# Patient Record
Sex: FEMALE | Race: WHITE | NOT HISPANIC OR LATINO | Employment: OTHER | URBAN - METROPOLITAN AREA
[De-identification: names, ages, dates, MRNs, and addresses within clinical notes are randomized per-mention and may not be internally consistent; named-entity substitution may affect disease eponyms.]

---

## 2023-09-22 PROBLEM — H92.02 OTALGIA, LEFT EAR: Status: ACTIVE | Noted: 2023-09-22

## 2023-09-22 PROBLEM — G43.511 MIGRAINE AURA, PERSISTENT, INTRACTABLE, WITH STATUS MIGRAINOSUS: Status: ACTIVE | Noted: 2023-09-22

## 2023-09-22 PROBLEM — H90.5 SENSORINEURAL HEARING LOSS OF LEFT EAR: Status: ACTIVE | Noted: 2023-09-22

## 2023-09-22 PROBLEM — H93.13 TINNITUS, BILATERAL: Status: ACTIVE | Noted: 2023-09-22

## 2023-09-22 RX ORDER — TOPIRAMATE 25 MG/1
25 TABLET ORAL 2 TIMES DAILY
COMMUNITY
End: 2024-01-02

## 2023-09-22 RX ORDER — IBUPROFEN 800 MG/1
TABLET ORAL
COMMUNITY
Start: 2016-01-25

## 2023-09-22 RX ORDER — SUMATRIPTAN SUCCINATE 100 MG/1
100 TABLET ORAL
COMMUNITY
Start: 2016-01-25

## 2023-09-22 RX ORDER — CHLORZOXAZONE 750 MG/1
TABLET ORAL
COMMUNITY
Start: 2016-08-12

## 2023-09-22 RX ORDER — VIT C/E/ZN/COPPR/LUTEIN/ZEAXAN 250MG-90MG
CAPSULE ORAL
COMMUNITY
Start: 2016-03-08

## 2023-09-22 RX ORDER — NYSTATIN 100000 [USP'U]/G
POWDER TOPICAL
COMMUNITY
Start: 2016-06-29

## 2023-09-22 RX ORDER — LANOLIN ALCOHOL/MO/W.PET/CERES
CREAM (GRAM) TOPICAL
COMMUNITY

## 2023-09-22 RX ORDER — MIDODRINE HYDROCHLORIDE 5 MG/1
5 TABLET ORAL 2 TIMES DAILY
COMMUNITY

## 2023-09-22 RX ORDER — ERGOCALCIFEROL 1.25 MG/1
CAPSULE ORAL
COMMUNITY
Start: 2016-06-08

## 2023-09-22 RX ORDER — FLUDROCORTISONE ACETATE 0.1 MG/1
0.1 TABLET ORAL DAILY
COMMUNITY

## 2023-11-30 ENCOUNTER — APPOINTMENT (OUTPATIENT)
Dept: PAIN MEDICINE | Facility: CLINIC | Age: 43
End: 2023-11-30

## 2024-01-02 DIAGNOSIS — G43.511 MIGRAINE AURA, PERSISTENT, INTRACTABLE, WITH STATUS MIGRAINOSUS: Primary | ICD-10-CM

## 2024-01-02 RX ORDER — TOPIRAMATE 25 MG/1
25 TABLET ORAL 2 TIMES DAILY
Qty: 180 TABLET | Refills: 1 | Status: SHIPPED | OUTPATIENT
Start: 2024-01-02

## 2024-01-11 ENCOUNTER — APPOINTMENT (OUTPATIENT)
Dept: PAIN MEDICINE | Facility: CLINIC | Age: 44
End: 2024-01-11
Payer: COMMERCIAL

## 2024-02-08 ENCOUNTER — APPOINTMENT (OUTPATIENT)
Dept: PAIN MEDICINE | Facility: CLINIC | Age: 44
End: 2024-02-08
Payer: COMMERCIAL

## 2024-02-22 ENCOUNTER — OFFICE VISIT (OUTPATIENT)
Dept: PAIN MEDICINE | Facility: CLINIC | Age: 44
End: 2024-02-22
Payer: COMMERCIAL

## 2024-02-22 VITALS
DIASTOLIC BLOOD PRESSURE: 60 MMHG | HEIGHT: 67 IN | RESPIRATION RATE: 16 BRPM | SYSTOLIC BLOOD PRESSURE: 90 MMHG | WEIGHT: 146 LBS | HEART RATE: 86 BPM | BODY MASS INDEX: 22.91 KG/M2

## 2024-02-22 DIAGNOSIS — G43.511 MIGRAINE AURA, PERSISTENT, INTRACTABLE, WITH STATUS MIGRAINOSUS: Primary | ICD-10-CM

## 2024-02-22 PROCEDURE — 2500000004 HC RX 250 GENERAL PHARMACY W/ HCPCS (ALT 636 FOR OP/ED): Mod: JZ | Performed by: ANESTHESIOLOGY

## 2024-02-22 PROCEDURE — 1036F TOBACCO NON-USER: CPT | Performed by: ANESTHESIOLOGY

## 2024-02-22 PROCEDURE — 96372 THER/PROPH/DIAG INJ SC/IM: CPT | Performed by: ANESTHESIOLOGY

## 2024-02-22 PROCEDURE — 64615 CHEMODENERV MUSC MIGRAINE: CPT | Performed by: ANESTHESIOLOGY

## 2024-02-22 RX ADMIN — ONABOTULINUMTOXINA 200 UNITS: 100 INJECTION, POWDER, LYOPHILIZED, FOR SOLUTION INTRADERMAL; INTRAMUSCULAR at 11:29

## 2024-02-22 ASSESSMENT — ENCOUNTER SYMPTOMS
GASTROINTESTINAL NEGATIVE: 1
ALLERGIC/IMMUNOLOGIC NEGATIVE: 1
EYES NEGATIVE: 1
CARDIOVASCULAR NEGATIVE: 1
PSYCHIATRIC NEGATIVE: 1
ENDOCRINE NEGATIVE: 1
RESPIRATORY NEGATIVE: 1
HEMATOLOGIC/LYMPHATIC NEGATIVE: 1
HEADACHES: 1
MUSCULOSKELETAL NEGATIVE: 1
CONSTITUTIONAL NEGATIVE: 1

## 2024-02-22 ASSESSMENT — LIFESTYLE VARIABLES: TOTAL SCORE: 5

## 2024-02-22 ASSESSMENT — PAIN SCALES - GENERAL: PAINLEVEL_OUTOF10: 3

## 2024-02-22 ASSESSMENT — PATIENT HEALTH QUESTIONNAIRE - PHQ9
1. LITTLE INTEREST OR PLEASURE IN DOING THINGS: NOT AT ALL
SUM OF ALL RESPONSES TO PHQ9 QUESTIONS 1 AND 2: 0
2. FEELING DOWN, DEPRESSED OR HOPELESS: NOT AT ALL

## 2024-02-22 ASSESSMENT — PAIN - FUNCTIONAL ASSESSMENT: PAIN_FUNCTIONAL_ASSESSMENT: 0-10

## 2024-02-22 NOTE — PROGRESS NOTES
Subjective   Patient ID: Tasha Robertson is a 43 y.o. female who presents for Migraine.  Migraine     Patient here today for Botox injections for chronic migraine.  She continues to have excellent pain relief with only having 1-2 migraine days per month.  She wants to continue therapy moving forward.  Review of Systems   Constitutional: Negative.    HENT: Negative.     Eyes: Negative.    Respiratory: Negative.     Cardiovascular: Negative.    Gastrointestinal: Negative.    Endocrine: Negative.    Genitourinary: Negative.    Musculoskeletal: Negative.    Skin: Negative.    Allergic/Immunologic: Negative.    Neurological:  Positive for headaches.   Hematological: Negative.    Psychiatric/Behavioral: Negative.         Objective   Physical Exam  Vitals and nursing note reviewed.   Constitutional:       Appearance: Normal appearance.   HENT:      Head: Normocephalic and atraumatic.      Right Ear: Ear canal and external ear normal.      Left Ear: Ear canal and external ear normal.      Nose: Nose normal.      Mouth/Throat:      Mouth: Mucous membranes are moist.      Pharynx: Oropharynx is clear.   Eyes:      Conjunctiva/sclera: Conjunctivae normal.      Pupils: Pupils are equal, round, and reactive to light.   Cardiovascular:      Rate and Rhythm: Normal rate.   Pulmonary:      Effort: Pulmonary effort is normal. No respiratory distress.   Musculoskeletal:      Cervical back: Normal range of motion and neck supple.      Lumbar back: Tenderness present. Normal range of motion.   Skin:     General: Skin is warm and dry.   Neurological:      Mental Status: She is alert.   Psychiatric:         Mood and Affect: Mood normal.         Thought Content: Thought content normal.   Botulinum Injection - Head/Face/Jaw    Date/Time: 2/22/2024 11:22 AM    Performed by: Kamran Zhong MD  Authorized by: Kamran Zhong MD      Consent:      Consent obtained:  Written     Consent given by:  Patient     Risks discussed:   Weakness    Procedure details:      EMG used?  No     Electrical stimulation used?  No     Diluted by:  Preservative free saline     Toxin (Brand):  OnaBoNT-A (Botox)     Comments about vials and dilution:  4 ml of NS in 200 unit     Concentration (u/mL):  50     Total units available:  200       Total units injected:  0     Total units wasted:  200    Post-procedure details:      Patient tolerance of procedure:  Tolerated well, no immediate complications    Comments: Botox for Migraines      I have reviewed the history and physical and examined the patient and there are no changes unless noted below:       Timeout was performed to verify patient name, , allergies and procedure being performed at     Patient is aware of potential risks and benefits of this procedure. Patient wishes to proceed.      Botox injection for migraine headache.  The skin was prepped with Alcohol swabs prior to the injection.  2- 100 unit Botox A vials were each were reconstituted in 2 ml of normal saline.  Then using a 30-gauge needle, 155 units of Botox A was injected at 31 different locations in divided doses according to migraine protocol.  The targeted muscles include the , procerus, frontalis, temporalis, occipitalis, cervical paraspinals and trapezius muscles bilaterally.  Patient tolerated the procedure without any issues.  155 units of Botox used and 45 units wasted.            Assessment/Plan   Problem List Items Addressed This Visit             ICD-10-CM       Neuro    Migraine aura, persistent, intractable, with status migrainosus - Primary G43.511    Relevant Medications    onabotulinumtoxinA (Botox) injection 200 Units (Start on 2024 11:45 AM)        Patient doing excellent today continues to see excellent benefit with Botox injection.  We will repeat in 12 weeks.  Risks, benefit, and alternatives of the procedure were discussed with the patient.  Oswestry score has been compelted and recorded.    Kamran JOSÉ  MD [place] 02/22/24 11:21 AM

## 2024-03-14 ENCOUNTER — APPOINTMENT (OUTPATIENT)
Dept: PAIN MEDICINE | Facility: CLINIC | Age: 44
End: 2024-03-14
Payer: COMMERCIAL

## 2024-05-23 ENCOUNTER — OFFICE VISIT (OUTPATIENT)
Dept: PAIN MEDICINE | Facility: CLINIC | Age: 44
End: 2024-05-23
Payer: COMMERCIAL

## 2024-05-23 VITALS
SYSTOLIC BLOOD PRESSURE: 100 MMHG | DIASTOLIC BLOOD PRESSURE: 62 MMHG | HEIGHT: 67 IN | WEIGHT: 146 LBS | BODY MASS INDEX: 22.91 KG/M2 | RESPIRATION RATE: 16 BRPM

## 2024-05-23 DIAGNOSIS — G43.511 MIGRAINE AURA, PERSISTENT, INTRACTABLE, WITH STATUS MIGRAINOSUS: Primary | ICD-10-CM

## 2024-05-23 PROCEDURE — 96372 THER/PROPH/DIAG INJ SC/IM: CPT | Mod: 59 | Performed by: ANESTHESIOLOGY

## 2024-05-23 PROCEDURE — 2500000004 HC RX 250 GENERAL PHARMACY W/ HCPCS (ALT 636 FOR OP/ED): Mod: JZ | Performed by: ANESTHESIOLOGY

## 2024-05-23 PROCEDURE — 64615 CHEMODENERV MUSC MIGRAINE: CPT | Performed by: ANESTHESIOLOGY

## 2024-05-23 PROCEDURE — 1036F TOBACCO NON-USER: CPT | Performed by: ANESTHESIOLOGY

## 2024-05-23 RX ADMIN — ONABOTULINUMTOXINA 155 UNITS: 100 INJECTION, POWDER, LYOPHILIZED, FOR SOLUTION INTRADERMAL; INTRAMUSCULAR at 11:44

## 2024-05-23 ASSESSMENT — PATIENT HEALTH QUESTIONNAIRE - PHQ9
2. FEELING DOWN, DEPRESSED OR HOPELESS: NOT AT ALL
SUM OF ALL RESPONSES TO PHQ9 QUESTIONS 1 AND 2: 0
1. LITTLE INTEREST OR PLEASURE IN DOING THINGS: NOT AT ALL

## 2024-05-23 ASSESSMENT — ENCOUNTER SYMPTOMS
RESPIRATORY NEGATIVE: 1
CARDIOVASCULAR NEGATIVE: 1
HEMATOLOGIC/LYMPHATIC NEGATIVE: 1
HEADACHES: 1
EYES NEGATIVE: 1
GASTROINTESTINAL NEGATIVE: 1
MUSCULOSKELETAL NEGATIVE: 1
ALLERGIC/IMMUNOLOGIC NEGATIVE: 1
ENDOCRINE NEGATIVE: 1
CONSTITUTIONAL NEGATIVE: 1
PSYCHIATRIC NEGATIVE: 1

## 2024-05-23 ASSESSMENT — PAIN - FUNCTIONAL ASSESSMENT: PAIN_FUNCTIONAL_ASSESSMENT: 0-10

## 2024-05-23 ASSESSMENT — PAIN SCALES - GENERAL: PAINLEVEL_OUTOF10: 4

## 2024-05-23 ASSESSMENT — PAIN DESCRIPTION - DESCRIPTORS: DESCRIPTORS: ACHING

## 2024-05-23 NOTE — PROGRESS NOTES
Subjective   Patient ID: Tasha Robertson is a 43 y.o. female who presents for Migraine.  Migraine   Patient here today for Botox treatment for migraines.  The patient continues to do well and have very few migraine headaches per month.  She says she did wake up with 1 today however she was able to take medications for rescue.  She does report less than 10 headache days per month.  She is very happy with the outcomes of the therapy and would like to continue moving forward with it.    Review of Systems   Constitutional: Negative.    HENT: Negative.     Eyes: Negative.    Respiratory: Negative.     Cardiovascular: Negative.    Gastrointestinal: Negative.    Endocrine: Negative.    Genitourinary: Negative.    Musculoskeletal: Negative.    Skin: Negative.    Allergic/Immunologic: Negative.    Neurological:  Positive for headaches.   Hematological: Negative.    Psychiatric/Behavioral: Negative.         Objective   Physical Exam  Vitals and nursing note reviewed.   Constitutional:       Appearance: Normal appearance.   HENT:      Head: Normocephalic and atraumatic.      Right Ear: Ear canal and external ear normal.      Left Ear: Ear canal and external ear normal.      Nose: Nose normal.      Mouth/Throat:      Mouth: Mucous membranes are moist.      Pharynx: Oropharynx is clear.   Eyes:      Conjunctiva/sclera: Conjunctivae normal.      Pupils: Pupils are equal, round, and reactive to light.   Cardiovascular:      Rate and Rhythm: Normal rate.   Pulmonary:      Effort: Pulmonary effort is normal. No respiratory distress.   Musculoskeletal:      Cervical back: Normal range of motion and neck supple.      Lumbar back: Normal range of motion.   Skin:     General: Skin is warm and dry.   Neurological:      Mental Status: She is alert.   Psychiatric:         Mood and Affect: Mood normal.         Thought Content: Thought content normal.     Procedure  Botox for Migraines      I have reviewed the history and physical and  examined the patient and there are no changes unless noted below:       Timeout was performed to verify patient name, , allergies and procedure being performed at     Patient is aware of potential risks and benefits of this procedure. Patient wishes to proceed.      Botox injection for migraine headache.  The skin was prepped with Alcohol swabs prior to the injection.  2- 100 unit Botox A vials were each were reconstituted in 2 ml of normal saline.  Then using a 30-gauge needle, 155 units of Botox A was injected at 31 different locations in divided doses according to migraine protocol.  The targeted muscles include the , procerus, frontalis, temporalis, occipitalis, cervical paraspinals and trapezius muscles bilaterally.  Patient tolerated the procedure without any issues.  155 units of Botox used and 45 units wasted.      Botulinum Injection - Head/Face/Jaw    Date/Time: 2024 12:54 PM    Performed by: Kamran Zhong MD  Authorized by: Kamran Zhong MD      Consent:      Consent obtained:  Written     Consent given by:  Patient     Risks discussed:  Weakness    Universal protocol:      Relevant documents present and verified:  Yes       Site/side verified:  Yes       Immediately prior to procedure a time out was called:  Yes       Patient identity confirmed:  Provided demographic data and verbally with patient    Procedure details:      EMG used?  No     Electrical stimulation used?  No     Diluted by:  Preservative free saline     Concentration (u/mL):  50       Total units injected:  0     Total units wasted:  0    Post-procedure details:      Patient tolerance of procedure:  Tolerated well, no immediate complications      Assessment/Plan   Problem List Items Addressed This Visit             ICD-10-CM    Migraine aura, persistent, intractable, with status migrainosus - Primary G43.511    Relevant Medications    onabotulinumtoxinA (Botox) injection 155 Units (Start on 2024 12:00 PM)         Patient continues to do excellent with Botox treatment for her migraines.  She continues to sustain very few migraine headaches per month.  She has no adverse side effects.  The patient has been chronically on this and doing well.  We will repeat in 12 weeks.    Kamran Zhong MD 05/23/24 11:30 AM

## 2024-07-28 DIAGNOSIS — G43.511 MIGRAINE AURA, PERSISTENT, INTRACTABLE, WITH STATUS MIGRAINOSUS: ICD-10-CM

## 2024-07-29 RX ORDER — TOPIRAMATE 25 MG/1
25 TABLET ORAL 2 TIMES DAILY
Qty: 180 TABLET | Refills: 1 | Status: SHIPPED | OUTPATIENT
Start: 2024-07-29

## 2024-09-05 ENCOUNTER — APPOINTMENT (OUTPATIENT)
Dept: PAIN MEDICINE | Facility: CLINIC | Age: 44
End: 2024-09-05
Payer: COMMERCIAL

## 2024-09-26 ENCOUNTER — OFFICE VISIT (OUTPATIENT)
Dept: PAIN MEDICINE | Facility: CLINIC | Age: 44
End: 2024-09-26
Payer: COMMERCIAL

## 2024-09-26 VITALS
WEIGHT: 146 LBS | HEART RATE: 81 BPM | RESPIRATION RATE: 16 BRPM | HEIGHT: 67 IN | SYSTOLIC BLOOD PRESSURE: 96 MMHG | BODY MASS INDEX: 22.91 KG/M2 | DIASTOLIC BLOOD PRESSURE: 66 MMHG | OXYGEN SATURATION: 99 %

## 2024-09-26 DIAGNOSIS — G43.511 MIGRAINE AURA, PERSISTENT, INTRACTABLE, WITH STATUS MIGRAINOSUS: Primary | ICD-10-CM

## 2024-09-26 DIAGNOSIS — G43.511 MIGRAINE AURA, PERSISTENT, INTRACTABLE, WITH STATUS MIGRAINOSUS: ICD-10-CM

## 2024-09-26 PROCEDURE — 3008F BODY MASS INDEX DOCD: CPT | Performed by: ANESTHESIOLOGY

## 2024-09-26 PROCEDURE — 99214 OFFICE O/P EST MOD 30 MIN: CPT | Performed by: ANESTHESIOLOGY

## 2024-09-26 PROCEDURE — 2500000004 HC RX 250 GENERAL PHARMACY W/ HCPCS (ALT 636 FOR OP/ED): Mod: JZ | Performed by: ANESTHESIOLOGY

## 2024-09-26 PROCEDURE — 1036F TOBACCO NON-USER: CPT | Performed by: ANESTHESIOLOGY

## 2024-09-26 PROCEDURE — 96372 THER/PROPH/DIAG INJ SC/IM: CPT | Performed by: ANESTHESIOLOGY

## 2024-09-26 RX ORDER — FROVATRIPTAN SUCCINATE 2.5 MG/1
2.5 TABLET, FILM COATED ORAL ONCE AS NEEDED
Qty: 9 TABLET | Refills: 3 | Status: SHIPPED | OUTPATIENT
Start: 2024-09-26 | End: 2024-09-27

## 2024-09-26 ASSESSMENT — ENCOUNTER SYMPTOMS
ENDOCRINE NEGATIVE: 1
GASTROINTESTINAL NEGATIVE: 1
MUSCULOSKELETAL NEGATIVE: 1
RESPIRATORY NEGATIVE: 1
CONSTITUTIONAL NEGATIVE: 1
HEMATOLOGIC/LYMPHATIC NEGATIVE: 1
PSYCHIATRIC NEGATIVE: 1
HEADACHES: 1
EYES NEGATIVE: 1
CARDIOVASCULAR NEGATIVE: 1
ALLERGIC/IMMUNOLOGIC NEGATIVE: 1

## 2024-09-26 ASSESSMENT — PAIN DESCRIPTION - DESCRIPTORS: DESCRIPTORS: ACHING

## 2024-09-26 ASSESSMENT — PATIENT HEALTH QUESTIONNAIRE - PHQ9
1. LITTLE INTEREST OR PLEASURE IN DOING THINGS: NOT AT ALL
2. FEELING DOWN, DEPRESSED OR HOPELESS: NOT AT ALL
SUM OF ALL RESPONSES TO PHQ9 QUESTIONS 1 AND 2: 0

## 2024-09-26 ASSESSMENT — PAIN SCALES - GENERAL
PAINLEVEL_OUTOF10: 8
PAINLEVEL: 3

## 2024-09-26 ASSESSMENT — PAIN - FUNCTIONAL ASSESSMENT: PAIN_FUNCTIONAL_ASSESSMENT: 0-10

## 2024-09-26 NOTE — PROGRESS NOTES
Subjective   Patient ID: Tasha Robertson is a 43 y.o. female who presents for Migraine.  Migraine     Patient here today for Botox treatment for her migraines.  The patient reports that the Botox continues to be effective for her she has less than 5 migraine days per month.  She would like to switch over to frovatriptan from her Imitrex to see if this would be better for rescue on her days if needed.  Review of Systems   Constitutional: Negative.    HENT: Negative.     Eyes: Negative.    Respiratory: Negative.     Cardiovascular: Negative.    Gastrointestinal: Negative.    Endocrine: Negative.    Genitourinary: Negative.    Musculoskeletal: Negative.    Skin: Negative.    Allergic/Immunologic: Negative.    Neurological:  Positive for headaches.   Hematological: Negative.    Psychiatric/Behavioral: Negative.         Objective   Physical Exam  Vitals and nursing note reviewed.   Constitutional:       Appearance: Normal appearance.   HENT:      Head: Normocephalic and atraumatic.      Right Ear: Ear canal and external ear normal.      Left Ear: Ear canal and external ear normal.      Nose: Nose normal.      Mouth/Throat:      Mouth: Mucous membranes are moist.      Pharynx: Oropharynx is clear.   Eyes:      Conjunctiva/sclera: Conjunctivae normal.      Pupils: Pupils are equal, round, and reactive to light.   Cardiovascular:      Rate and Rhythm: Normal rate.   Pulmonary:      Effort: Pulmonary effort is normal. No respiratory distress.   Musculoskeletal:      Cervical back: Normal range of motion and neck supple.      Lumbar back: Normal range of motion.   Skin:     General: Skin is warm and dry.   Neurological:      Mental Status: She is alert.   Psychiatric:         Mood and Affect: Mood normal.         Thought Content: Thought content normal.   Botulinum Injection - Head/Face/Jaw    Date/Time: 9/26/2024 2:01 PM    Performed by: Kamran Zhong MD  Authorized by: Kamran Zhong MD      Consent:      Consent  obtained:  Written     Consent given by:  Patient     Risks discussed:  Poor cosmetic result     Alternatives discussed:  No treatment    Universal protocol:      Relevant documents present and verified:  Yes       Site/side verified:  Yes       Immediately prior to procedure a time out was called:  Yes       Patient identity confirmed:  Provided demographic data and verbally with patient    Procedure details:      EMG used?  No     Electrical stimulation used?  No     Diluted by:  Preservative free saline     Toxin (Brand):  OnaBoNT-A (Botox)     Concentration (u/mL):  50       Total units injected:  0     Total units wasted:  0    Post-procedure details:      Patient tolerance of procedure:  Tolerated well, no immediate complications    Comments: Botox for Migraines      I have reviewed the history and physical and examined the patient and there are no changes unless noted below:       Timeout was performed to verify patient name, , allergies and procedure being performed at     Patient is aware of potential risks and benefits of this procedure. Patient wishes to proceed.      Botox injection for migraine headache.  The skin was prepped with Alcohol swabs prior to the injection.  2- 100 unit Botox A vials were each were reconstituted in 2 ml of normal saline.  Then using a 30-gauge needle, 155 units of Botox A was injected at 31 different locations in divided doses according to migraine protocol.  The targeted muscles include the , procerus, frontalis, temporalis, occipitalis, cervical paraspinals and trapezius muscles bilaterally.  Patient tolerated the procedure without any issues.  155 units of Botox used and 45 units wasted.            Assessment/Plan   Problem List Items Addressed This Visit             ICD-10-CM    Migraine aura, persistent, intractable, with status migrainosus - Primary G43.511    Relevant Medications    frovatriptan (Frova) 2.5 mg tablet        Patient done excellent with  Botox injections we will continue every 12 weeks.  Risks, benefit, and alternatives of the procedure were discussed with the patient.  Oswestry score has been compelted and recorded.    We will move forward for switching her from Imitrex to frovatriptan 2.5 mg for rescue.    Kamran Zhong MD 09/26/24 2:00 PM

## 2024-09-27 RX ORDER — FROVATRIPTAN SUCCINATE 2.5 MG/1
2.5 TABLET, FILM COATED ORAL ONCE AS NEEDED
Qty: 9 TABLET | Refills: 3 | Status: SHIPPED | OUTPATIENT
Start: 2024-09-27

## 2024-12-30 ENCOUNTER — APPOINTMENT (OUTPATIENT)
Dept: PAIN MEDICINE | Facility: CLINIC | Age: 44
End: 2024-12-30
Payer: COMMERCIAL

## 2025-01-02 ENCOUNTER — APPOINTMENT (OUTPATIENT)
Dept: PAIN MEDICINE | Facility: CLINIC | Age: 45
End: 2025-01-02
Payer: COMMERCIAL

## 2025-01-25 DIAGNOSIS — G43.511 MIGRAINE AURA, PERSISTENT, INTRACTABLE, WITH STATUS MIGRAINOSUS: ICD-10-CM

## 2025-01-27 RX ORDER — TOPIRAMATE 25 MG/1
25 TABLET ORAL 2 TIMES DAILY
Qty: 180 TABLET | Refills: 1 | Status: SHIPPED | OUTPATIENT
Start: 2025-01-27

## 2025-02-03 ENCOUNTER — OFFICE VISIT (OUTPATIENT)
Dept: PAIN MEDICINE | Facility: CLINIC | Age: 45
End: 2025-02-03
Payer: COMMERCIAL

## 2025-02-03 VITALS
BODY MASS INDEX: 22.91 KG/M2 | HEIGHT: 67 IN | RESPIRATION RATE: 16 BRPM | SYSTOLIC BLOOD PRESSURE: 98 MMHG | WEIGHT: 146 LBS | DIASTOLIC BLOOD PRESSURE: 62 MMHG

## 2025-02-03 DIAGNOSIS — G43.511 MIGRAINE AURA, PERSISTENT, INTRACTABLE, WITH STATUS MIGRAINOSUS: Primary | ICD-10-CM

## 2025-02-03 PROCEDURE — 1036F TOBACCO NON-USER: CPT | Performed by: ANESTHESIOLOGY

## 2025-02-03 PROCEDURE — 2500000004 HC RX 250 GENERAL PHARMACY W/ HCPCS (ALT 636 FOR OP/ED): Mod: JZ | Performed by: ANESTHESIOLOGY

## 2025-02-03 PROCEDURE — 3008F BODY MASS INDEX DOCD: CPT | Performed by: ANESTHESIOLOGY

## 2025-02-03 RX ADMIN — ONABOTULINUMTOXINA 55 UNITS: 100 INJECTION, POWDER, LYOPHILIZED, FOR SOLUTION INTRADERMAL; INTRAMUSCULAR at 13:27

## 2025-02-03 RX ADMIN — ONABOTULINUMTOXINA 100 UNITS: 100 INJECTION, POWDER, LYOPHILIZED, FOR SOLUTION INTRADERMAL; INTRAMUSCULAR at 13:27

## 2025-02-03 RX ADMIN — ONABOTULINUMTOXINA 50 UNITS: 100 INJECTION, POWDER, LYOPHILIZED, FOR SOLUTION INTRADERMAL; INTRAMUSCULAR at 13:22

## 2025-02-03 ASSESSMENT — PAIN SCALES - GENERAL
PAINLEVEL_OUTOF10: 2
PAINLEVEL_OUTOF10: 2

## 2025-02-03 ASSESSMENT — ENCOUNTER SYMPTOMS
ALLERGIC/IMMUNOLOGIC NEGATIVE: 1
RESPIRATORY NEGATIVE: 1
HEADACHES: 1
HEMATOLOGIC/LYMPHATIC NEGATIVE: 1
CARDIOVASCULAR NEGATIVE: 1
MUSCULOSKELETAL NEGATIVE: 1
CONSTITUTIONAL NEGATIVE: 1
ENDOCRINE NEGATIVE: 1
PSYCHIATRIC NEGATIVE: 1
EYES NEGATIVE: 1
GASTROINTESTINAL NEGATIVE: 1

## 2025-02-03 ASSESSMENT — PAIN DESCRIPTION - DESCRIPTORS: DESCRIPTORS: ACHING

## 2025-02-03 ASSESSMENT — PAIN - FUNCTIONAL ASSESSMENT: PAIN_FUNCTIONAL_ASSESSMENT: 0-10

## 2025-02-03 NOTE — PROGRESS NOTES
Subjective   Patient ID: Tasha Robertson is a 44 y.o. female who presents for Migraine.  Migraine       Patient here today for Botox treatment for her chronic migraines.  Patient continues to do excellent with less than 3 migraine days per month.  We will continue moving forward with repeat procedure every 12 weeks.  Patient here today for repeat.  Review of Systems   Constitutional: Negative.    HENT: Negative.     Eyes: Negative.    Respiratory: Negative.     Cardiovascular: Negative.    Gastrointestinal: Negative.    Endocrine: Negative.    Genitourinary: Negative.    Musculoskeletal: Negative.    Skin: Negative.    Allergic/Immunologic: Negative.    Neurological:  Positive for headaches.   Hematological: Negative.    Psychiatric/Behavioral: Negative.         Objective   Physical Exam  Vitals and nursing note reviewed.   Constitutional:       Appearance: Normal appearance.   HENT:      Head: Normocephalic and atraumatic.      Right Ear: Ear canal and external ear normal.      Left Ear: Ear canal and external ear normal.      Nose: Nose normal.      Mouth/Throat:      Mouth: Mucous membranes are moist.      Pharynx: Oropharynx is clear.   Eyes:      Conjunctiva/sclera: Conjunctivae normal.      Pupils: Pupils are equal, round, and reactive to light.   Cardiovascular:      Rate and Rhythm: Normal rate.   Pulmonary:      Effort: Pulmonary effort is normal. No respiratory distress.   Musculoskeletal:      Cervical back: Normal range of motion and neck supple.      Lumbar back: Normal range of motion.   Skin:     General: Skin is warm and dry.   Neurological:      Mental Status: She is alert.   Psychiatric:         Mood and Affect: Mood normal.         Thought Content: Thought content normal.     Botulinum Injection - Head/Face/Jaw    Date/Time: 2/3/2025 1:22 PM    Performed by: Kamran Zhong MD  Authorized by: Kamran Zhong MD      Consent:      Consent obtained:  Verbal     Consent given by:  Patient      Risks discussed:  Weakness and dysphagia     Alternatives discussed:  No treatment    Procedure details:      EMG used?  No     Electrical stimulation used?  No     Diluted by:  Preservative free saline     Medications: 50 Units onabotulinumtoxinA 100 unit        Total units injected:  0     Total units wasted:  0    Post-procedure details:      Patient tolerance of procedure:  Procedure terminated at patient's request    Comments: Botox for Migraines      I have reviewed the history and physical and examined the patient and there are no changes unless noted below:       Timeout was performed to verify patient name, , allergies and procedure being performed at     Patient is aware of potential risks and benefits of this procedure. Patient wishes to proceed.      Botox injection for migraine headache.  The skin was prepped with Alcohol swabs prior to the injection.  2- 100 unit Botox A vials were each were reconstituted in 2 ml of normal saline.  Then using a 30-gauge needle, 155 units of Botox A was injected at 31 different locations in divided doses according to migraine protocol.  The targeted muscles include the , procerus, frontalis, temporalis, occipitalis, cervical paraspinals and trapezius muscles bilaterally.  Patient tolerated the procedure without any issues.  155 units of Botox used and 45 units wasted.                Assessment/Plan   Problem List Items Addressed This Visit             ICD-10-CM    Migraine aura, persistent, intractable, with status migrainosus - Primary G43.511        Patient continues to do excellent with Botox injections for her chronic migraines.  Patient again achieves less than 3 migraine days per month.  We will continue repeating Botox procedure every 12 weeks.    Kamran Zhong MD 25 1:21 PM

## 2025-02-17 ENCOUNTER — TELEPHONE (OUTPATIENT)
Dept: PAIN MEDICINE | Facility: CLINIC | Age: 45
End: 2025-02-17
Payer: COMMERCIAL

## 2025-02-17 NOTE — TELEPHONE ENCOUNTER
Pharmacy called for this patient for Botox order. Her appointment with Dr. Zhong is Thursday 05/08/2025.

## 2025-05-08 ENCOUNTER — APPOINTMENT (OUTPATIENT)
Dept: PAIN MEDICINE | Facility: CLINIC | Age: 45
End: 2025-05-08
Payer: COMMERCIAL

## 2025-06-09 DIAGNOSIS — G43.511 MIGRAINE AURA, PERSISTENT, INTRACTABLE, WITH STATUS MIGRAINOSUS: Primary | ICD-10-CM

## 2025-06-20 RX ORDER — ONABOTULINUMTOXINA 100 [USP'U]/1
INJECTION, POWDER, LYOPHILIZED, FOR SOLUTION INTRADERMAL; INTRAMUSCULAR
Qty: 2 EACH | Refills: 3 | Status: SHIPPED | OUTPATIENT
Start: 2025-06-20

## 2025-06-23 DIAGNOSIS — G43.511 MIGRAINE AURA, PERSISTENT, INTRACTABLE, WITH STATUS MIGRAINOSUS: ICD-10-CM

## 2025-06-23 NOTE — TELEPHONE ENCOUNTER
Pharmacist calls requesting refill for botox and schedule a delivery. Pt has follow up appointment with Dr. Zhong on 07/03/2025.

## 2025-07-03 ENCOUNTER — APPOINTMENT (OUTPATIENT)
Dept: PAIN MEDICINE | Facility: CLINIC | Age: 45
End: 2025-07-03
Payer: COMMERCIAL

## 2025-07-27 DIAGNOSIS — G43.511 MIGRAINE AURA, PERSISTENT, INTRACTABLE, WITH STATUS MIGRAINOSUS: ICD-10-CM

## 2025-07-28 RX ORDER — TOPIRAMATE 25 MG/1
25 TABLET, FILM COATED ORAL 2 TIMES DAILY
Qty: 180 TABLET | Refills: 1 | Status: SHIPPED | OUTPATIENT
Start: 2025-07-28

## 2025-08-18 ENCOUNTER — APPOINTMENT (OUTPATIENT)
Facility: CLINIC | Age: 45
End: 2025-08-18
Payer: COMMERCIAL

## 2025-08-18 VITALS
HEIGHT: 67 IN | BODY MASS INDEX: 22.91 KG/M2 | RESPIRATION RATE: 16 BRPM | SYSTOLIC BLOOD PRESSURE: 90 MMHG | WEIGHT: 146 LBS | DIASTOLIC BLOOD PRESSURE: 62 MMHG

## 2025-08-18 DIAGNOSIS — G43.511: Primary | ICD-10-CM

## 2025-08-18 PROCEDURE — 3008F BODY MASS INDEX DOCD: CPT | Performed by: ANESTHESIOLOGY

## 2025-08-18 PROCEDURE — 2500000004 HC RX 250 GENERAL PHARMACY W/ HCPCS (ALT 636 FOR OP/ED): Mod: JZ | Performed by: ANESTHESIOLOGY

## 2025-08-18 PROCEDURE — 1036F TOBACCO NON-USER: CPT | Performed by: ANESTHESIOLOGY

## 2025-08-18 PROCEDURE — 99213 OFFICE O/P EST LOW 20 MIN: CPT

## 2025-08-18 RX ADMIN — ONABOTULINUMTOXINA 55 UNITS: 100 INJECTION, POWDER, LYOPHILIZED, FOR SOLUTION INTRADERMAL; INTRAMUSCULAR at 09:09

## 2025-08-18 RX ADMIN — ONABOTULINUMTOXINA 100 UNITS: 100 INJECTION, POWDER, LYOPHILIZED, FOR SOLUTION INTRADERMAL; INTRAMUSCULAR at 09:09

## 2025-08-18 RX ADMIN — ONABOTULINUMTOXINA 100 UNITS: 100 INJECTION, POWDER, LYOPHILIZED, FOR SOLUTION INTRADERMAL; INTRAMUSCULAR at 09:00

## 2025-08-18 ASSESSMENT — ENCOUNTER SYMPTOMS
PSYCHIATRIC NEGATIVE: 1
CARDIOVASCULAR NEGATIVE: 1
EYES NEGATIVE: 1
HEMATOLOGIC/LYMPHATIC NEGATIVE: 1
RESPIRATORY NEGATIVE: 1
GASTROINTESTINAL NEGATIVE: 1
ENDOCRINE NEGATIVE: 1
HEADACHES: 1
CONSTITUTIONAL NEGATIVE: 1
MUSCULOSKELETAL NEGATIVE: 1
ALLERGIC/IMMUNOLOGIC NEGATIVE: 1

## 2025-08-18 ASSESSMENT — PAIN - FUNCTIONAL ASSESSMENT: PAIN_FUNCTIONAL_ASSESSMENT: 0-10

## 2025-08-18 ASSESSMENT — PAIN SCALES - GENERAL
PAINLEVEL_OUTOF10: 3
PAINLEVEL_OUTOF10: 3

## 2025-08-18 ASSESSMENT — PAIN DESCRIPTION - DESCRIPTORS: DESCRIPTORS: ACHING
